# Patient Record
Sex: MALE | Race: WHITE | NOT HISPANIC OR LATINO | Employment: OTHER | URBAN - METROPOLITAN AREA
[De-identification: names, ages, dates, MRNs, and addresses within clinical notes are randomized per-mention and may not be internally consistent; named-entity substitution may affect disease eponyms.]

---

## 2017-02-06 ENCOUNTER — GENERIC CONVERSION - ENCOUNTER (OUTPATIENT)
Dept: OTHER | Facility: OTHER | Age: 72
End: 2017-02-06

## 2017-04-17 ENCOUNTER — GENERIC CONVERSION - ENCOUNTER (OUTPATIENT)
Dept: OTHER | Facility: OTHER | Age: 72
End: 2017-04-17

## 2017-06-23 ENCOUNTER — GENERIC CONVERSION - ENCOUNTER (OUTPATIENT)
Dept: OTHER | Facility: OTHER | Age: 72
End: 2017-06-23

## 2017-07-12 DIAGNOSIS — Z00.00 ENCOUNTER FOR GENERAL ADULT MEDICAL EXAMINATION WITHOUT ABNORMAL FINDINGS: ICD-10-CM

## 2017-07-18 ENCOUNTER — ALLSCRIPTS OFFICE VISIT (OUTPATIENT)
Dept: OTHER | Facility: OTHER | Age: 72
End: 2017-07-18

## 2018-01-13 NOTE — PROGRESS NOTES
Assessment    1  Encounter for preventive health examination (V70 0) (Z00 00)   2  Medicare annual wellness visit, subsequent (V70 0) (Z00 00)    Plan  Medicare annual wellness visit, subsequent    · Follow-up visit in 1 year Evaluation and Treatment  Follow-up  Status: Hold For -  Scheduling  Requested for: [id number]   · These are things you can do to prevent falls in and around the home ; Status:Complete;    Done: [id number] 08:54AM   · We encourage all of our patients to exercise regularly  30 minutes of exercise or physical  activity five or more days a week is recommended for children and adults ;  Status:Complete;   Done: [id number] 08:54AM    Chief Complaint  PT PRESENTS FOR AN AWV  PT REPORTS DOING WELL NO ISSUES  History of Present Illness  He is feeling well  He is very active  No complaints  The patient is being seen for the subsequent annual wellness visit  Medicare Screening and Risk Factors   Hospitalizations: no previous hospitalizations  Medicare Screening Tests Risk Questions   Drug and Alcohol Use: The patient has never smoked cigarettes  The patient reports occasional alcohol use and drinking 2 drinks per week  He has never used illicit drugs  Diet and Physical Activity: Current diet includes well balanced meals, 1 servings of fruit per day, 1 servings of vegetables per day, 1 servings of meat per day, 1 servings of whole grains per day, 1 cups of coffee per day and 2 cans of diet soda per day  He exercises 7 times per week  Exercise: walking, strength training 10 hours per week  Mood Disorder and Cognitive Impairment Screening: He denies feeling down, depressed, or hopeless over the past two weeks  He denies feeling little interest or pleasure in doing things over the past two weeks     Cognitive impairment screening: denies difficulty learning/retaining new information, denies difficulty handling complex tasks, denies difficulty with reasoning, denies difficulty with spatial ability and orientation, denies difficulty with language and denies difficulty with behavior  Functional Ability/Level of Safety: Hearing is normal bilaterally, normal in the right ear, normal in the left ear and a hearing aid is not used  He denies hearing difficulties  Activities of daily living details: does not need help using the phone, no transportation help needed, does not need help shopping, no meal preparation help needed, does not need help doing housework, does not need help doing laundry, does not need help managing medications and does not need help managing money  Home safety risk factors:  no unfamiliar surroundings, no loose rugs, no poor household lighting, no uneven floors, no household clutter, grab bars in the bathroom and handrails on the stairs  Advance Directives: Advance directives: living will, durable power of  for health care directives and advance directives  Co-Managers and Medical Equipment/Suppliers: See Patient Care Team   Falls Risk: The patient fell 0 times in the past 12 months  The patient is currently asymptomatic Symptoms Include:  Associated symptoms:  No associated symptoms are reported  The patient currently has no urinary incontinence symptoms  Date of last glaucoma screen was 2/2016      Review of Systems    Constitutional: negative  Head and Face: negative  Eyes: negative  ENT: negative  Cardiovascular: negative  Respiratory: negative  Gastrointestinal: negative  Genitourinary: negative  Musculoskeletal: negative  Integumentary and Breasts: negative  Neurological: negative  Psychiatric: negative  Endocrine: negative  Hematologic and Lymphatic: negative  Active Problems    1  Back pain (724 5) (M54 9)   2  Bursitis of right hip (726 5) (M70 71)   3  Bursitis of right shoulder (726 10) (M75 51)   4  Cervical radiculopathy (723 4) (M54 12)   5  Colon cancer screening (V76 51) (Z12 11)   6   Community acquired pneumonia (5) (J18 9)   7  Encounter for screening for cardiovascular disorders (V81 2) (Z13 6)   8  Need for prophylactic vaccination and inoculation against influenza (V04 81) (Z23)   9  Osteoarthritis of knee (715 36) (M17 9)   10  Preoperative clearance (V72 84) (Z01 818)   11  Subcutaneous nodule (782 2) (R22 9)   12  Viral gastroenteritis (008 8) (A08 4)    Past Medical History    1  History of Fracture Of The Ankle (824 8)   2  History of hemorrhoids (V13 89) (Z87 19)   3  Need for prophylactic vaccination and inoculation against influenza (V04 81) (Z23)    The active problems and past medical history were reviewed and updated today  Surgical History    1  History of Hemorrhoidectomy    Family History  Mother    1  Family history of Stroke Syndrome (V17 1)  Family History    2  Family history of Cancer   3  Family history of Diabetes Mellitus (V18 0)    The family history was reviewed and updated today  Social History    · Denied: History of Alcohol Use (History)   · Denied: History of Drug Use   · Never A Smoker  The social history was reviewed and updated today  Current Meds   1  Multiple Vitamins TABS; Therapy: (Recorded:14Nov2013) to Recorded    The medication list was reviewed and updated today  Allergies    1  No Known Drug Allergies    Immunizations  Hepatitis A --- [facility]: [id number]   Hepatitis B --- [facility]: [id number]; Peggy James: [id number]; Evelni Fontenot: [id number]   Influenza --- [facility]: [id number] [place]oon: 16-Oct-2013; [facility] 14-Oct[facility]; Series4:  23-Oct-2015; Catrachita Dec: [id number]; Series6: [id number]   PCV --- [facility]: 29-Jul-2015   PPSV --- [facility]: [id number]   Tdap --- Series1: [id number]   Zoster --- Series1: [id number]     Vitals  Signs   Recorded: [id number] 29:04QN   Systolic: 684, LUE, Sitting  Diastolic: 58, LUE, Sitting  Weight: 186 lb 4 oz  BMI Calculated: 29 61  BSA Calculated: 1 95    Health Management  Colon cancer screening   COLONOSCOPY; every 10 years;  Next Due: [id number]; Overdue    Signatures   Electronically signed by : Manuela Dominguez DO; Aug 31 2016  8:55AM EST                       (Author)

## 2018-01-14 VITALS
HEIGHT: 66 IN | DIASTOLIC BLOOD PRESSURE: 80 MMHG | BODY MASS INDEX: 30.7 KG/M2 | SYSTOLIC BLOOD PRESSURE: 122 MMHG | WEIGHT: 191 LBS | TEMPERATURE: 99.4 F

## 2018-03-30 ENCOUNTER — OFFICE VISIT (OUTPATIENT)
Dept: FAMILY MEDICINE CLINIC | Facility: CLINIC | Age: 73
End: 2018-03-30
Payer: COMMERCIAL

## 2018-03-30 VITALS — WEIGHT: 194.2 LBS | DIASTOLIC BLOOD PRESSURE: 70 MMHG | SYSTOLIC BLOOD PRESSURE: 132 MMHG | BODY MASS INDEX: 31.58 KG/M2

## 2018-03-30 DIAGNOSIS — T14.8XXA SKIN ABRASION: Primary | ICD-10-CM

## 2018-03-30 PROCEDURE — 99213 OFFICE O/P EST LOW 20 MIN: CPT | Performed by: FAMILY MEDICINE

## 2018-03-30 RX ORDER — CIPROFLOXACIN 500 MG/1
1 TABLET, FILM COATED ORAL 2 TIMES DAILY
COMMUNITY
Start: 2017-09-19 | End: 2018-03-30

## 2018-03-30 RX ORDER — MULTIVITAMIN WITH IRON
TABLET ORAL
COMMUNITY

## 2018-03-30 RX ORDER — CHLORAL HYDRATE 500 MG
CAPSULE ORAL
COMMUNITY

## 2018-03-30 NOTE — PROGRESS NOTES
Assessment/Plan:    22-year-old male with:  Skin abrasion on scrotum  Discussed local care  Discuss that this likely occurred due to inadvertent scratch well bathing  Discussed that if it is not healing or if it worsens he should call back  Discuss that if he notices blood coming from other sources such as his urine or his stool he must call immediately  No problem-specific Assessment & Plan notes found for this encounter  Diagnoses and all orders for this visit:    Skin abrasion    Other orders  -     aspirin 81 MG tablet; Take by mouth  -     Calcium Carb-Cholecalciferol 1000-800 MG-UNIT TABS; Take by mouth  -     Discontinue: ciprofloxacin (CIPRO) 500 mg tablet; Take 1 tablet by mouth 2 (two) times a day  -     Omega-3 Fatty Acids (FISH OIL) 1,000 mg; Take by mouth  -     Multiple Vitamins tablet; Take by mouth          Subjective:   Chief Complaint   Patient presents with    Bleeding/Bruising          Patient ID: Singh Potts is a 74 y o  male  Patient is a 22-year-old male who presents complaining of some bleeding that was observed today in the shower  Patient went to the gym and after his workout was in the shower when he noticed that there was blood on the floor  Patient looked and did not see any obvious signs of blood in his urine or his stool although he did notice some blood in his underwear  No fevers chills nausea vomiting  No dysuria  No constipation or diarrhea  no perceived trauma that he is aware of  The following portions of the patient's history were reviewed and updated as appropriate: allergies, current medications, past family history, past medical history, past social history, past surgical history and problem list     Review of Systems   Constitutional: Negative  HENT: Negative  Eyes: Negative  Respiratory: Negative  Cardiovascular: Negative  Gastrointestinal: Negative  Endocrine: Negative  Genitourinary: Negative      Musculoskeletal: Negative  Allergic/Immunologic: Negative  Neurological: Negative  Hematological: Negative  Psychiatric/Behavioral: Negative  All other systems reviewed and are negative  Objective:      /70 (BP Location: Right arm, Patient Position: Sitting, Cuff Size: Standard)   Wt 88 1 kg (194 lb 3 2 oz)   BMI 31 58 kg/m²          Physical Exam   Constitutional: He is oriented to person, place, and time  He appears well-developed and well-nourished  HENT:   Head: Atraumatic  Right Ear: External ear normal    Left Ear: External ear normal    Eyes: Conjunctivae and EOM are normal  Pupils are equal, round, and reactive to light  Neck: Normal range of motion  Pulmonary/Chest: Effort normal  No respiratory distress  Genitourinary:   Genitourinary Comments: Small abrasion with dried blood on his scrotum  Nontender  No erythema  Musculoskeletal: Normal range of motion  Neurological: He is alert and oriented to person, place, and time  No cranial nerve deficit  Skin: Skin is warm and dry  Psychiatric: He has a normal mood and affect   His behavior is normal  Judgment and thought content normal

## 2018-07-02 DIAGNOSIS — Z00.00 ADULT GENERAL MEDICAL EXAMINATION: Primary | ICD-10-CM

## 2018-07-12 ENCOUNTER — OFFICE VISIT (OUTPATIENT)
Dept: FAMILY MEDICINE CLINIC | Facility: CLINIC | Age: 73
End: 2018-07-12
Payer: COMMERCIAL

## 2018-07-12 VITALS
SYSTOLIC BLOOD PRESSURE: 110 MMHG | HEIGHT: 66 IN | WEIGHT: 185.6 LBS | BODY MASS INDEX: 29.83 KG/M2 | DIASTOLIC BLOOD PRESSURE: 60 MMHG

## 2018-07-12 DIAGNOSIS — Z12.11 SCREENING FOR COLON CANCER: ICD-10-CM

## 2018-07-12 DIAGNOSIS — R13.19 ESOPHAGEAL DYSPHAGIA: ICD-10-CM

## 2018-07-12 DIAGNOSIS — Z00.00 WELL ADULT EXAM: Primary | ICD-10-CM

## 2018-07-12 PROBLEM — T14.8XXA SKIN ABRASION: Status: RESOLVED | Noted: 2018-03-30 | Resolved: 2018-07-12

## 2018-07-12 PROCEDURE — 1160F RVW MEDS BY RX/DR IN RCRD: CPT | Performed by: FAMILY MEDICINE

## 2018-07-12 PROCEDURE — 99397 PER PM REEVAL EST PAT 65+ YR: CPT | Performed by: FAMILY MEDICINE

## 2018-07-12 RX ORDER — BIOTIN 10000 MCG
CAPSULE ORAL
COMMUNITY

## 2018-07-12 RX ORDER — MAGNESIUM 30 MG
30 TABLET ORAL 2 TIMES DAILY
COMMUNITY

## 2018-07-12 RX ORDER — PROPRANOLOL/HYDROCHLOROTHIAZID 40 MG-25MG
TABLET ORAL
COMMUNITY

## 2018-07-12 NOTE — PROGRESS NOTES
Assessment/Plan: We reviewed his recent lab work today  The numbers are all good  Continue diet exercise  Follow-up in 6 months sooner if needed  Diagnoses and all orders for this visit:    Well adult exam    Screening for colon cancer    Esophageal dysphagia  -     Ambulatory Referral to Otolaryngology; Future    Other orders  -     Cancel: Ambulatory referral to Gastroenterology; Future  -     Cancel: Occult Bloood,Fecal Immunochemical; Future  -     magnesium 30 MG tablet; Take 30 mg by mouth 2 (two) times a day  -     Turmeric 500 MG CAPS; Take by mouth  -     Biotin 10 MG CAPS; Take by mouth  -     Cod Liver Oil 1000 MG CAPS; Take by mouth          Subjective:      Patient ID: Malissa Ellis is a 74 y o  male  He is feeling well, no complaints  The following portions of the patient's history were reviewed and updated as appropriate: allergies, current medications, past family history, past medical history, past social history, past surgical history and problem list     Review of Systems   Constitutional: Negative  HENT: Negative  Eyes: Negative  Respiratory: Negative  Cardiovascular: Negative  Gastrointestinal: Negative  Endocrine: Negative  Genitourinary: Negative  Musculoskeletal: Negative  Skin: Negative  Allergic/Immunologic: Negative  Neurological: Negative  Hematological: Negative  Psychiatric/Behavioral: Negative  All other systems reviewed and are negative  Objective:      /60 (BP Location: Left arm, Patient Position: Sitting, Cuff Size: Standard)   Ht 5' 5 75" (1 67 m)   Wt 84 2 kg (185 lb 9 6 oz)   BMI 30 18 kg/m²          Physical Exam   Constitutional: He is oriented to person, place, and time  He appears well-developed and well-nourished  HENT:   Head: Normocephalic and atraumatic     Right Ear: External ear normal    Left Ear: External ear normal    Nose: Nose normal    Mouth/Throat: Oropharynx is clear and moist  Eyes: Conjunctivae and EOM are normal  Pupils are equal, round, and reactive to light  Neck: Normal range of motion  Neck supple  Cardiovascular: Normal rate, regular rhythm and normal heart sounds  Pulmonary/Chest: Effort normal and breath sounds normal    Abdominal: Soft  Bowel sounds are normal    Musculoskeletal: Normal range of motion  Neurological: He is alert and oriented to person, place, and time  He has normal reflexes  Skin: Skin is warm and dry  Psychiatric: He has a normal mood and affect  His behavior is normal    Nursing note and vitals reviewed

## 2018-09-25 ENCOUNTER — HOSPITAL ENCOUNTER (EMERGENCY)
Facility: HOSPITAL | Age: 73
Discharge: HOME | End: 2018-09-25
Attending: EMERGENCY MEDICINE
Payer: COMMERCIAL

## 2018-09-25 VITALS
OXYGEN SATURATION: 100 % | RESPIRATION RATE: 20 BRPM | DIASTOLIC BLOOD PRESSURE: 64 MMHG | SYSTOLIC BLOOD PRESSURE: 132 MMHG | TEMPERATURE: 97.6 F | HEART RATE: 60 BPM

## 2018-09-25 DIAGNOSIS — H11.32 SUBCONJUNCTIVAL HEMORRHAGE, NON-TRAUMATIC, LEFT: ICD-10-CM

## 2018-09-25 DIAGNOSIS — H11.422 CHEMOSIS OF CONJUNCTIVA SUBCONJUNCTIVAL EDEMA, LEFT: Primary | ICD-10-CM

## 2018-09-25 PROCEDURE — 99283 EMERGENCY DEPT VISIT LOW MDM: CPT

## 2018-09-25 RX ORDER — ERYTHROMYCIN 5 MG/G
OINTMENT OPHTHALMIC
Qty: 1 G | Refills: 0 | Status: SHIPPED | OUTPATIENT
Start: 2018-09-25 | End: 2019-06-04

## 2018-09-25 RX ORDER — BUPIVACAINE HYDROCHLORIDE 5 MG/ML
5 INJECTION, SOLUTION EPIDURAL; INTRACAUDAL ONCE
Status: DISCONTINUED | OUTPATIENT
Start: 2018-09-25 | End: 2018-09-25

## 2018-09-25 RX ADMIN — FLUORESCEIN SODIUM AND PROPARACAINE HYDROCHLORIDE 1 DROP: 2.5; 5 SOLUTION/ DROPS OPHTHALMIC at 22:10

## 2018-09-26 NOTE — DISCHARGE INSTRUCTIONS
Subconjunctival Hemorrhage   WHAT YOU NEED TO KNOW:   What is a subconjunctival hemorrhage? A subconjunctival hemorrhage is when blood collects under the conjunctiva in your eye  The conjunctiva is the clear lining that covers the white part of your eye  The blood comes from broken blood vessels under the conjunctiva  What causes a subconjunctival hemorrhage? The exact cause of your subconjunctival hemorrhage may not be known  The following are common causes:  · An accident or injury to the eye    · Hard coughs or sneezes    · Medical conditions, such as high blood pressure, diabetes, or a bleeding disorder    · Strain, such as when you lift a heavy object or have a bowel movement    · Blood thinning medicines    · Vomiting  What are the signs and symptoms of a subconjunctival hemorrhage? The most common sign is a red patch on the white part of your eye  The redness may be present for 2 to 3 weeks  You may have mild pain when you move your eye  How is a subconjunctival hemorrhage diagnosed? Your healthcare provider will examine your eye and check your vision  You may need tests to check for an underlying medical condition  How is a subconjunctival hemorrhage treated? A subconjunctival hemorrhage usually goes away on its own  You may need any of the following to help manage your symptoms:  · Cold or warm compress:  Use a cold pack during the first 24 hours  Ask how often to apply it and for how long each time  After the first 24 hours, apply a warm pack on your eye  Do this 3 times each day for about 10 to 15 minutes each time  · Eyedrops: You may need artificial tears to keep your eye moist  Use the drops as directed  When should I contact my healthcare provider? · The redness in your eye has not gone away after 3 weeks  · You have another subconjunctival hemorrhage  · You have subconjunctival hemorrhages in both eyes      · You have questions or concerns about your condition or care   When should I seek immediate care? · You have eye pain or sensitivity to light  · Your vision changes  · You have white or yellow discharge from your eye  CARE AGREEMENT:   You have the right to help plan your care  Learn about your health condition and how it may be treated  Discuss treatment options with your caregivers to decide what care you want to receive  You always have the right to refuse treatment  The above information is an  only  It is not intended as medical advice for individual conditions or treatments  Talk to your doctor, nurse or pharmacist before following any medical regimen to see if it is safe and effective for you  © 2017 [employer]  Information is for End User's use only and may not be sold, redistributed or otherwise used for commercial purposes  All illustrations and images included in CareNotes® are the copyrighted property of A D [employer] M [employer] Inc  or Candel[employer]

## 2018-09-26 NOTE — ED ATTENDING ATTESTATION
Rod Botello DO, saw and evaluated the patient  I have discussed the patient with the resident/non-physician practitioner and agree with the resident's/non-physician practitioner's findings, Plan of Care, and MDM as documented in the resident's/non-physician practitioner's note, except where noted  All available labs and Radiology studies were reviewed  At this point I agree with the current assessment done in the Emergency Department  I have conducted an independent evaluation of this patient a history and physical is as follows:    Pt seen and examined  Takes baby ASA daily, began around 1830 with spontaneous eye irritation - looked in mirror and saw bloody chemosis/ANGEL  No trauma, no valsalva that he remembers  Visual acuity nml, PERRL, no hyphema  Pressure with sandhya pen 21, no uptake with fluorocein stain and pt to f/u with optho       Final diagnoses:   Chemosis of conjunctiva subconjunctival edema, left   Subconjunctival hemorrhage, non-traumatic, left     Critical Care Time  CritCare Time    Procedures

## 2018-09-26 NOTE — ED PROVIDER NOTES
History  Chief Complaint   Patient presents with    Eye Problem     Pt complains of blood shot left eye while eating supper tonight  Reports pain behind left eye  Denies injuries  Denies vision changes  This is a 20-year-old male with no past medical history presents to the emergency department this evening with left eye irritation that started around 6:00 p m  Prabhu Mota Patient states that he was eating dinner and without any inciting event his left eye became irritated with no pain  Patient states he finished eating his dinner and afterwards went to look in appearance all the is left eye had become blood shot  Patient states the initially it was not that bad any is had that happen before so he did not think much of it  However, the injection became worse in started to have pain behind the eye so he wanted to come in and get checked out to make sure that it was not a warning sign for something more serious    Patient states that the pain behind his eyes currently a 1/10 and is more annoying than anything else  He denies any vision changes and he does not have any pain with looking into bright light or with any eye movement  Patient states he takes a baby aspirin a day along with visual but denies any other blood thinners  He does not wear contacts  Prior to Admission Medications   Prescriptions Last Dose Informant Patient Reported? Taking?    Biotin 10 MG CAPS   Yes No   Sig: Take by mouth   Calcium Carb-Cholecalciferol 1000-800 MG-UNIT TABS   Yes No   Sig: Take by mouth   Cod Liver Oil 1000 MG CAPS   Yes No   Sig: Take by mouth   Multiple Vitamins tablet   Yes No   Sig: Take by mouth   Omega-3 Fatty Acids (FISH OIL) 1,000 mg   Yes No   Sig: Take by mouth   Turmeric 500 MG CAPS   Yes No   Sig: Take by mouth   aspirin 81 MG tablet   Yes No   Sig: Take by mouth   magnesium 30 MG tablet   Yes No   Sig: Take 30 mg by mouth 2 (two) times a day      Facility-Administered Medications: None       Past Medical History:   Diagnosis Date    Fracture of ankle     Hemorrhoids        Past Surgical History:   Procedure Laterality Date    HEMORRHOID SURGERY         Family History   Problem Relation Age of Onset    Stroke Mother     Cancer Family     Diabetes Family      I have reviewed and agree with the history as documented  Social History   Substance Use Topics    Smoking status: Never Smoker    Smokeless tobacco: Never Used    Alcohol use Yes      Comment: occasional wine        Review of Systems   Constitutional: Negative for chills, diaphoresis and fever  HENT: Negative for congestion, rhinorrhea, sinus pressure and sore throat  Eyes: Positive for pain and redness  Negative for photophobia, discharge and visual disturbance  Respiratory: Negative for cough, chest tightness and shortness of breath  Cardiovascular: Negative for chest pain  Gastrointestinal: Negative for abdominal pain, constipation, diarrhea, nausea and vomiting  Genitourinary: Negative for dysuria, frequency, hematuria and urgency  Musculoskeletal: Negative for arthralgias and myalgias  Skin: Negative for color change and rash  Neurological: Negative for dizziness, numbness and headaches  Physical Exam  ED Triage Vitals [09/25/18 2102]   Temperature Pulse Respirations Blood Pressure SpO2   97 6 °F (36 4 °C) 60 20 132/64 100 %      Temp Source Heart Rate Source Patient Position - Orthostatic VS BP Location FiO2 (%)   Temporal Monitor -- Right arm --      Pain Score       --           Orthostatic Vital Signs  Vitals:    09/25/18 2102   BP: 132/64   Pulse: 60       Physical Exam   Constitutional: He is oriented to person, place, and time  He appears well-developed and well-nourished  No distress  HENT:   Head: Normocephalic and atraumatic  Eyes: EOM are normal  Pupils are equal, round, and reactive to light  Right eye exhibits no discharge  Left eye exhibits no discharge  No scleral icterus     Patient's left eye with extensive subconjunctival hemorrhage with chemosis  Neck: Normal range of motion  Neck supple  No JVD present  Cardiovascular: Normal rate, regular rhythm and normal heart sounds  Exam reveals no gallop and no friction rub  No murmur heard  Pulmonary/Chest: Effort normal and breath sounds normal  No stridor  No respiratory distress  He has no wheezes  He has no rales  Abdominal: Soft  Bowel sounds are normal  He exhibits no distension  There is no tenderness  There is no guarding  Musculoskeletal: Normal range of motion  He exhibits no edema, tenderness or deformity  Neurological: He is alert and oriented to person, place, and time  No cranial nerve deficit or sensory deficit  He exhibits normal muscle tone  Skin: Skin is warm and dry  No rash noted  He is not diaphoretic  No erythema  No pallor  Psychiatric: He has a normal mood and affect  His behavior is normal    Nursing note and vitals reviewed  ED Medications  Medications   Fluorescein-Proparacaine (FLUCAINE) 0 25-0 5 % ophthalmic solution 1 drop (1 drop Left Eye Given 9/25/18 2210)       Diagnostic Studies  Results Reviewed     None                 No orders to display         Procedures  Procedures      Phone Consults  ED Phone Contact    ED Course                               MDM  Number of Diagnoses or Management Options  Chemosis of conjunctiva subconjunctival edema, left:   Subconjunctival hemorrhage, non-traumatic, left:   Diagnosis management comments: Patient intra-ocular pressure was 21 in the left eye, his visual acuity was unchanged and equal to his right eye, there was no fluorescein uptake when examined with a Wood's lamp, and bedside ultrasound did not reveal any signs of retinal detachment or hemorrhage  Patient was discharged home with erythromycin ointment and an eye patch and given the contact information for Ophthalmology and told to follow up with them as soon as possible    Patient instructed to return to the emergency department should he develop any new or concerning symptoms  CritCare Time    Disposition  Final diagnoses:   Chemosis of conjunctiva subconjunctival edema, left   Subconjunctival hemorrhage, non-traumatic, left     Time reflects when diagnosis was documented in both MDM as applicable and the Disposition within this note     Time User Action Codes Description Comment    9/25/2018  9:43 PM Augustine Bolton Add [R10 9] Abdominal pain     9/25/2018 10:18 PM Earna Lev Remove [R10 9] Abdominal pain     9/25/2018 10:19 PM Wilson Jose Roberto M Add [H94 207] Chemosis of conjunctiva subconjunctival edema, left     9/25/2018 10:19 PM Wilson Jose Roberto M Add [H11 32] Subconjunctival hemorrhage, non-traumatic, left       ED Disposition     ED Disposition Condition Comment    Discharge  Towana Pod discharge to home/self care      Condition at discharge: Good        Follow-up Information     Follow up With Specialties Details Why Contact Info Additional Information    Monika Lerma MD Ophthalmology Schedule an appointment as soon as possible for a visit  [address]  [address] [address] Emergency Department Emergency Medicine  If symptoms worsen [address] [facility]  [phone] [place] [facility], [address], [place], [address]          Discharge Medication List as of 9/25/2018 10:24 PM      START taking these medications    Details   erythromycin (ILOTYCIN) ophthalmic ointment Place a 1/2 inch ribbon of ointment into the lower eyelid every 4 hours while awake, Print         CONTINUE these medications which have NOT CHANGED    Details   aspirin 81 MG tablet Take by mouth, Historical Med      Biotin 10 MG CAPS Take by mouth, Historical Med      Calcium Carb-Cholecalciferol 1000-800 MG-UNIT TABS Take by mouth, Historical Med      Cod Liver Oil 1000 MG CAPS Take by mouth, Historical Med      magnesium 30 MG tablet Take 30 mg by mouth 2 (two) times a day, Historical Med      Multiple Vitamins tablet Take by mouth, Historical Med      Omega-3 Fatty Acids (FISH OIL) 1,000 mg Take by mouth, Historical Med      Turmeric 500 MG CAPS Take by mouth, Historical Med           No discharge procedures on file  ED Provider  Attending physically available and evaluated Alan Brown  VERNON managed the patient along with the ED Attending      Electronically Signed by         Sandra Ennis MD  09/26/18 0000

## 2018-10-29 ENCOUNTER — IMMUNIZATION (OUTPATIENT)
Dept: FAMILY MEDICINE CLINIC | Facility: CLINIC | Age: 73
End: 2018-10-29
Payer: COMMERCIAL

## 2018-10-29 DIAGNOSIS — Z23 NEED FOR INFLUENZA VACCINATION: Primary | ICD-10-CM

## 2018-10-29 PROCEDURE — 4040F PNEUMOC VAC/ADMIN/RCVD: CPT

## 2018-10-29 PROCEDURE — G0008 ADMIN INFLUENZA VIRUS VAC: HCPCS

## 2018-10-29 PROCEDURE — 90662 IIV NO PRSV INCREASED AG IM: CPT

## 2019-01-31 ENCOUNTER — TELEPHONE (OUTPATIENT)
Dept: FAMILY MEDICINE CLINIC | Facility: CLINIC | Age: 74
End: 2019-01-31

## 2019-01-31 NOTE — TELEPHONE ENCOUNTER
I am not sure which gastro a issues he is referring to  He is overdue for an appointment and it would be better for him to come to the office and see me we can discuss it face to face during his appointment  If he refuses he should at least let me know what gastro is use he would like something for  Please call him and see if he will make an appointment to see me

## 2019-02-01 DIAGNOSIS — Z71.84 TRAVEL ADVICE ENCOUNTER: Primary | ICD-10-CM

## 2019-02-01 DIAGNOSIS — Z71.84 TRAVEL ADVICE ENCOUNTER: ICD-10-CM

## 2019-02-01 RX ORDER — CIPROFLOXACIN 500 MG/1
500 TABLET, FILM COATED ORAL EVERY 12 HOURS SCHEDULED
Qty: 14 TABLET | Refills: 0 | Status: SHIPPED | OUTPATIENT
Start: 2019-02-01 | End: 2019-02-01 | Stop reason: SDUPTHER

## 2019-02-01 RX ORDER — CIPROFLOXACIN 500 MG/1
500 TABLET, FILM COATED ORAL EVERY 12 HOURS SCHEDULED
Qty: 14 TABLET | Refills: 0 | Status: SHIPPED | OUTPATIENT
Start: 2019-02-01 | End: 2019-02-08

## 2019-02-01 NOTE — TELEPHONE ENCOUNTER
Patient tour group and [facility] suggested that tourist have something to take in case of possible gastro intestinal problem (s)

## 2019-03-18 ENCOUNTER — OFFICE VISIT (OUTPATIENT)
Dept: FAMILY MEDICINE CLINIC | Facility: CLINIC | Age: 74
End: 2019-03-18
Payer: COMMERCIAL

## 2019-03-18 VITALS
SYSTOLIC BLOOD PRESSURE: 118 MMHG | DIASTOLIC BLOOD PRESSURE: 70 MMHG | HEIGHT: 66 IN | TEMPERATURE: 99.2 F | WEIGHT: 184 LBS | BODY MASS INDEX: 29.57 KG/M2

## 2019-03-18 DIAGNOSIS — A09 DIARRHEA OF INFECTIOUS ORIGIN: Primary | ICD-10-CM

## 2019-03-18 PROCEDURE — 99213 OFFICE O/P EST LOW 20 MIN: CPT | Performed by: FAMILY MEDICINE

## 2019-03-18 RX ORDER — DIPHENOXYLATE HYDROCHLORIDE AND ATROPINE SULFATE 2.5; .025 MG/1; MG/1
1 TABLET ORAL 4 TIMES DAILY PRN
Qty: 30 TABLET | Refills: 0 | Status: SHIPPED | OUTPATIENT
Start: 2019-03-18 | End: 2019-06-04

## 2019-03-18 NOTE — PROGRESS NOTES
Assessment/Plan:    I recommend supportive care, fluids, probiotics, a yogurt a day  Follow-up if not a lot better in 3 days  Diagnoses and all orders for this visit:    Diarrhea of infectious origin  -     diphenoxylate-atropine (LOMOTIL) 2 5-0 025 mg per tablet; Take 1 tablet by mouth 4 (four) times a day as needed for diarrhea          Subjective:      Patient ID: Willy Ravi is a 74 y o  male  [place] 3 [place] ago  He developed diarrhea  He did have some Cipro which she took which seemed to resolve the symptoms but his symptoms are returning since he finished it  The following portions of the patient's history were reviewed and updated as appropriate: allergies, current medications, past family history, past medical history, past social history, past surgical history and problem list     Review of Systems   Constitutional: Negative  HENT: Negative  Eyes: Negative  Respiratory: Negative  Cardiovascular: Negative  Gastrointestinal: Positive for abdominal pain and diarrhea  Negative for abdominal distention, nausea, rectal pain and vomiting  Endocrine: Negative  Genitourinary: Negative  Musculoskeletal: Negative  Skin: Negative  Allergic/Immunologic: Negative  Neurological: Negative  Hematological: Negative  Psychiatric/Behavioral: Negative  All other systems reviewed and are negative  Objective:      /70 (BP Location: Left arm)   Temp 99 2 °F (37 3 °C)   Ht 5' 5 75" (1 67 m)   Wt 83 5 kg (184 lb)   BMI 29 92 kg/m²          Physical Exam   Constitutional: He is oriented to person, place, and time  He appears well-developed and well-nourished  HENT:   Head: Normocephalic and atraumatic  Right Ear: External ear normal    Left Ear: External ear normal    Nose: Nose normal    Mouth/Throat: Oropharynx is clear and moist    Eyes: Pupils are equal, round, and reactive to light   Conjunctivae and EOM are normal    Neck: Normal range of motion  Neck supple  Cardiovascular: Normal rate, regular rhythm and normal heart sounds  Pulmonary/Chest: Effort normal and breath sounds normal    Abdominal: Soft  Bowel sounds are normal    Musculoskeletal: Normal range of motion  Neurological: He is alert and oriented to person, place, and time  He has normal reflexes  Skin: Skin is warm and dry  Psychiatric: He has a normal mood and affect  His behavior is normal    Nursing note and vitals reviewed

## 2019-03-21 ENCOUNTER — OFFICE VISIT (OUTPATIENT)
Dept: FAMILY MEDICINE CLINIC | Facility: CLINIC | Age: 74
End: 2019-03-21
Payer: COMMERCIAL

## 2019-03-21 VITALS
BODY MASS INDEX: 29.99 KG/M2 | WEIGHT: 180 LBS | TEMPERATURE: 98.5 F | HEIGHT: 65 IN | DIASTOLIC BLOOD PRESSURE: 76 MMHG | SYSTOLIC BLOOD PRESSURE: 112 MMHG

## 2019-03-21 DIAGNOSIS — A09 DIARRHEA OF INFECTIOUS ORIGIN: Primary | ICD-10-CM

## 2019-03-21 PROCEDURE — 1036F TOBACCO NON-USER: CPT | Performed by: FAMILY MEDICINE

## 2019-03-21 PROCEDURE — 1160F RVW MEDS BY RX/DR IN RCRD: CPT | Performed by: FAMILY MEDICINE

## 2019-03-21 PROCEDURE — 99213 OFFICE O/P EST LOW 20 MIN: CPT | Performed by: FAMILY MEDICINE

## 2019-03-21 RX ORDER — CIPROFLOXACIN 500 MG/1
500 TABLET, FILM COATED ORAL EVERY 12 HOURS SCHEDULED
Qty: 20 TABLET | Refills: 0 | Status: SHIPPED | OUTPATIENT
Start: 2019-03-21 | End: 2019-03-31

## 2019-03-21 NOTE — PROGRESS NOTES
Assessment/Plan:    Will check some stool cultures for Giardia C diff Cryptosporidium  Start Cipro  Follow-up in 3-5 days if not a lot better  Diagnoses and all orders for this visit:    Diarrhea of infectious origin  -     Giardia antigen; Future  -     Stool Enteric Bacterial Panel by PCR; Future  -     Clostridium difficile toxin by PCR; Future  -     ciprofloxacin (CIPRO) 500 mg tablet; Take 1 tablet (500 mg total) by mouth every 12 (twelve) hours for 10 days          Subjective:      Patient ID: Mariano Cuellar is a 74 y o  male  He still suffering from persistent diarrhea and gas pains  When he wakes up in the morning generally he is good but the diarrhea comes on in the afternoon  The Lomotil seems to help the symptoms but they persist   When he was in [facility] he took some Cipro with seem to help but the symptoms did return  The following portions of the patient's history were reviewed and updated as appropriate: allergies, current medications, past family history, past medical history, past social history, past surgical history and problem list     Review of Systems   Constitutional: Negative  HENT: Negative  Respiratory: Negative  Gastrointestinal: Positive for abdominal distention, abdominal pain and diarrhea  Negative for blood in stool and constipation  Genitourinary: Negative  Objective:      /76 (BP Location: Right arm, Patient Position: Sitting, Cuff Size: Standard)   Temp 98 5 °F (36 9 °C) (Tympanic)   Ht 5' 5" (1 651 m)   Wt 81 6 kg (180 lb)   BMI 29 95 kg/m²          Physical Exam   Constitutional: He is oriented to person, place, and time  He appears well-developed and well-nourished  HENT:   Head: Normocephalic and atraumatic  Right Ear: External ear normal    Left Ear: External ear normal    Nose: Nose normal    Mouth/Throat: Oropharynx is clear and moist    Eyes: Pupils are equal, round, and reactive to light   Conjunctivae and EOM are normal    Neck: Normal range of motion  Neck supple  Cardiovascular: Normal rate, regular rhythm and normal heart sounds  Pulmonary/Chest: Effort normal and breath sounds normal    Abdominal: Soft  He exhibits distension  He exhibits no mass  There is no tenderness  There is no guarding  Musculoskeletal: Normal range of motion  Neurological: He is alert and oriented to person, place, and time  He has normal reflexes  Skin: Skin is warm and dry  Psychiatric: He has a normal mood and affect  His behavior is normal    Nursing note and vitals reviewed

## 2019-03-26 ENCOUNTER — TELEPHONE (OUTPATIENT)
Dept: FAMILY MEDICINE CLINIC | Facility: CLINIC | Age: 74
End: 2019-03-26

## 2019-03-27 NOTE — TELEPHONE ENCOUNTER
I spoke with the pt  I explained the detail below to him  He is still currently finishing the Cipro he was prescribed so he cannot make a good judgement as to how his symptoms are  He is wondering a) what are potential causes for this and b) if he is contagious?? **I have not called Constance Mohan back from [facility] because it is after 6 pm and she is only in the office until 430  **

## 2019-03-27 NOTE — TELEPHONE ENCOUNTER
It is often times not clear where he got the bacterium from  Sometimes it can be from taking did food or from someone in his family or friends that have the infection or sometimes from something like a pet such as a reptile    Patient should finish the antibiotics and also take probiotics and if his symptoms are not improving or if they are worsening we can have him see GI

## 2019-03-27 NOTE — TELEPHONE ENCOUNTER
Please call patient  Perhaps the call was before the Campylobacter was back  Please inform patient that, yes, the stool test for Campylobacter is positive  If patient is still symptomatic we can treat him if he would like

## 2019-03-27 NOTE — TELEPHONE ENCOUNTER
Raegan Tipton from the department of health called questioning why the patients labs detected campylobacter spp but he was told its normal   Raegan Tipton and patient would like an explanation  Call back number for Raegan Tipton is ([phone])  She is only in the office until 4:30 today she will be back tomorrow

## 2019-03-28 NOTE — TELEPHONE ENCOUNTER
I spoke to Dr Mario Alberto [place] he states this is taken care of (he spoke to [place]) and is going to try to contact Dr Stephania Bernard today to discuss this with him

## 2019-06-04 ENCOUNTER — OFFICE VISIT (OUTPATIENT)
Dept: FAMILY MEDICINE CLINIC | Facility: CLINIC | Age: 74
End: 2019-06-04
Payer: COMMERCIAL

## 2019-06-04 VITALS
TEMPERATURE: 97.9 F | DIASTOLIC BLOOD PRESSURE: 68 MMHG | BODY MASS INDEX: 30.99 KG/M2 | WEIGHT: 186 LBS | SYSTOLIC BLOOD PRESSURE: 112 MMHG | HEIGHT: 65 IN

## 2019-06-04 DIAGNOSIS — J30.1 SEASONAL ALLERGIC RHINITIS DUE TO POLLEN: Primary | ICD-10-CM

## 2019-06-04 PROCEDURE — 1036F TOBACCO NON-USER: CPT | Performed by: FAMILY MEDICINE

## 2019-06-04 PROCEDURE — 1160F RVW MEDS BY RX/DR IN RCRD: CPT | Performed by: FAMILY MEDICINE

## 2019-06-04 PROCEDURE — 99213 OFFICE O/P EST LOW 20 MIN: CPT | Performed by: FAMILY MEDICINE

## 2019-06-04 RX ORDER — DESLORATADINE 5 MG/1
5 TABLET ORAL DAILY
Qty: 30 TABLET | Refills: 2 | Status: SHIPPED | OUTPATIENT
Start: 2019-06-04 | End: 2019-09-02

## 2019-06-04 RX ORDER — MONTELUKAST SODIUM 10 MG/1
10 TABLET ORAL
Qty: 30 TABLET | Refills: 5 | Status: SHIPPED | OUTPATIENT
Start: 2019-06-04

## 2019-07-30 ENCOUNTER — TELEPHONE (OUTPATIENT)
Dept: FAMILY MEDICINE CLINIC | Facility: CLINIC | Age: 74
End: 2019-07-30

## 2019-07-30 DIAGNOSIS — Z00.00 WELL ADULT EXAM: Primary | ICD-10-CM

## 2019-07-30 DIAGNOSIS — Z12.5 PROSTATE CANCER SCREENING: ICD-10-CM

## 2019-07-30 NOTE — TELEPHONE ENCOUNTER
PT IS COMING IN FOR A PHYSICAL & WANTS LABS DONE BEFORE HIS APPT ON 08/08/19 - WHAT LABS WOULD YOU LIKED ORDERED? PT HAS TO COME IN TODAY TO GET THE PAPERWORK

## 2019-08-08 ENCOUNTER — OFFICE VISIT (OUTPATIENT)
Dept: FAMILY MEDICINE CLINIC | Facility: CLINIC | Age: 74
End: 2019-08-08
Payer: COMMERCIAL

## 2019-08-08 VITALS
SYSTOLIC BLOOD PRESSURE: 118 MMHG | BODY MASS INDEX: 30.39 KG/M2 | DIASTOLIC BLOOD PRESSURE: 62 MMHG | OXYGEN SATURATION: 98 % | HEART RATE: 72 BPM | WEIGHT: 182.4 LBS | HEIGHT: 65 IN

## 2019-08-08 DIAGNOSIS — Z00.00 MEDICARE ANNUAL WELLNESS VISIT, SUBSEQUENT: Primary | ICD-10-CM

## 2019-08-08 DIAGNOSIS — N18.31 CHRONIC KIDNEY DISEASE (CKD) STAGE G3A/A1, MODERATELY DECREASED GLOMERULAR FILTRATION RATE (GFR) BETWEEN 45-59 ML/MIN/1.73 SQUARE METER AND ALBUMINURIA CREATININE RATIO LESS THAN 30 MG/G (HCC): ICD-10-CM

## 2019-08-08 PROCEDURE — 4040F PNEUMOC VAC/ADMIN/RCVD: CPT | Performed by: FAMILY MEDICINE

## 2019-08-08 PROCEDURE — 3725F SCREEN DEPRESSION PERFORMED: CPT | Performed by: FAMILY MEDICINE

## 2019-08-08 PROCEDURE — 1036F TOBACCO NON-USER: CPT | Performed by: FAMILY MEDICINE

## 2019-08-08 PROCEDURE — 1160F RVW MEDS BY RX/DR IN RCRD: CPT | Performed by: FAMILY MEDICINE

## 2019-08-08 PROCEDURE — 1101F PT FALLS ASSESS-DOCD LE1/YR: CPT | Performed by: FAMILY MEDICINE

## 2019-08-08 PROCEDURE — 1125F AMNT PAIN NOTED PAIN PRSNT: CPT | Performed by: FAMILY MEDICINE

## 2019-08-08 PROCEDURE — 1170F FXNL STATUS ASSESSED: CPT | Performed by: FAMILY MEDICINE

## 2019-08-08 PROCEDURE — 90471 IMMUNIZATION ADMIN: CPT | Performed by: FAMILY MEDICINE

## 2019-08-08 PROCEDURE — G0439 PPPS, SUBSEQ VISIT: HCPCS | Performed by: FAMILY MEDICINE

## 2019-08-08 PROCEDURE — 90714 TD VACC NO PRESV 7 YRS+ IM: CPT | Performed by: FAMILY MEDICINE

## 2019-08-08 PROCEDURE — 90732 PPSV23 VACC 2 YRS+ SUBQ/IM: CPT | Performed by: FAMILY MEDICINE

## 2019-08-08 PROCEDURE — G0009 ADMIN PNEUMOCOCCAL VACCINE: HCPCS | Performed by: FAMILY MEDICINE

## 2019-08-08 RX ORDER — LANOLIN ALCOHOL/MO/W.PET/CERES
1 CREAM (GRAM) TOPICAL 3 TIMES DAILY
COMMUNITY

## 2019-08-08 NOTE — PROGRESS NOTES
Assessment and Plan:     Problem List Items Addressed This Visit        Genitourinary    Chronic kidney disease (CKD) stage G3a/A1, moderately decreased glomerular filtration rate (GFR) between 45-59 mL/min/1 73 square meter and albuminuria creatinine ratio less than 30 mg/g ([facility])    Relevant Orders    Renal function panel    Microalbumin / creatinine urine ratio    Urine Microscopic    Uric acid      Other Visit Diagnoses     Medicare annual wellness visit, subsequent    -  Primary         History of Present Illness:     Patient presents for Welcome to Medicare visit  Patient Care Team:  Marcelle Hernandez DO as PCP - General     Review of Systems:     Review of Systems   Constitutional: Negative  HENT: Negative  Eyes: Negative  Respiratory: Negative  Cardiovascular: Negative  Gastrointestinal: Negative  Negative for abdominal distention, abdominal pain, blood in stool, bowel incontinence, constipation, diarrhea, nausea and vomiting  Endocrine: Negative  Genitourinary: Negative  Musculoskeletal: Negative  Skin: Negative  Allergic/Immunologic: Negative  Neurological: Negative  Hematological: Negative  Psychiatric/Behavioral: Negative  The patient is not nervous/anxious  All other systems reviewed and are negative         Problem List:     Patient Active Problem List   Diagnosis    Prostate cancer screening    Esophageal dysphagia    Diarrhea of infectious origin    Seasonal allergic rhinitis due to pollen    Chronic kidney disease (CKD) stage G3a/A1, moderately decreased glomerular filtration rate (GFR) between 45-59 mL/min/1 73 square meter and albuminuria creatinine ratio less than 30 mg/g [facility])      Past Medical and Surgical History:     Past Medical History:   Diagnosis Date    Fracture of ankle     Hemorrhoids      Past Surgical History:   Procedure Laterality Date    HEMORRHOID SURGERY        Family History:     Family History   Problem Relation Age of Onset  Stroke Mother     Cancer Family     Diabetes Family       Social History:     Social History     Tobacco Use   Smoking Status Never Smoker   Smokeless Tobacco Never Used     Social History     Substance and Sexual Activity   Alcohol Use Yes    Comment: occasional wine     Social History     Substance and Sexual Activity   Drug Use No      Medications and Allergies:     Current Outpatient Medications   Medication Sig Dispense Refill    aspirin 81 MG tablet Take by mouth      Biotin 10 MG CAPS Take by mouth      Calcium Carb-Cholecalciferol 1000-800 MG-UNIT TABS Take by mouth      Cod Liver Oil 1000 MG CAPS Take by mouth      glucosamine-chondroitin 500-400 MG tablet Take 1 tablet by mouth 3 (three) times a day      magnesium 30 MG tablet Take 30 mg by mouth 2 (two) times a day      Multiple Vitamins tablet Take by mouth      Omega-3 Fatty Acids (FISH OIL) 1,000 mg Take by mouth      Turmeric 500 MG CAPS Take by mouth      desloratadine (CLARINEX) 5 MG tablet Take 1 tablet (5 mg total) by mouth daily for 90 days 30 tablet 2    montelukast (SINGULAIR) 10 mg tablet Take 1 tablet (10 mg total) by mouth daily at bedtime (Patient not taking: Reported on 8/8/2019) 30 tablet 5     No current facility-administered medications for this visit        No Known Allergies   Immunizations:     Immunization History   Administered Date(s) Administered    Hep A, adult 1945    Hep B, adult 1945, 1945, 1945    Influenza Split High Dose Preservative Free IM 10/10/2012, 10/16/2013, 10/14/2014, 10/23/2015, 10/19/2016, 10/25/2017    Influenza TIV (IM) 1945, 1945    Influenza, high dose seasonal 0 5 mL 10/29/2018    Pneumococcal Conjugate 13-Valent 07/29/2015    Pneumococcal Polysaccharide PPV23 1943    Tdap 1945    Typhoid Live, oral 06/28/2016    Zoster 1945    Zoster Vaccine Recombinant 06/03/2019      Medicare Screening Tests and Risk Assessments:     Hector Maldonado is here for his Subsequent Wellness visit  Health Risk Assessment:  Patient rates overall health as good  Patient feels that their physical health rating is Same  Eyesight was rated as Same  Hearing was rated as Same  Patient feels that their emotional and mental health rating is Same  Pain experienced by patient in the last 7 days has been None  Patient states that he has experienced no weight loss or gain in last 6 months  Emotional/Mental Health:  Patient has not been feeling nervous/anxious  PHQ-9 Depression Screening:    Frequency of the following problems over the past two weeks:      1  Little interest or pleasure in doing things: 0 - not at all      2  Feeling down, depressed, or hopeless: 0 - not at all  PHQ-2 Score: 0          Broken Bones/Falls: Fall Risk Assessment:    In the past year, patient has experienced: No history of falling in past year          Bladder/Bowel:  Patient has not leaked urine accidently in the last six months  Patient reports no loss of bowel control  Immunizations:  Patient has had a flu vaccination within the last year  Patient has received a pneumonia shot  Patient has received a shingles shot  Patient has not received tetanus/diphtheria shot  Home Safety:  Patient does not have trouble with stairs inside or outside of their home  Patient currently reports that there are safety hazards present in home , working smoke alarms, no working carbon monoxide detectors  (Additional Comments: Home is all electric  Pt also has radon ventilation  )    Preventative Screenings:   prostate cancer screen performed, 7/31/2019  colon cancer screen completed, cholesterol screen completed, 7/31/2019  glaucoma eye exam completed, 2/1/2019      Nutrition:  Current diet: Regular with servings of the following:    Medications:  Patient is currently taking over-the-counter supplements       List of OTC medications includes: vitamins  Patient is able to manage medications  (Additional Comments: Pt does not take any prescription medications, only vitamins  )Lifestyle Choices:  Patient reports no tobacco use  Patient has not smoked or used tobacco in the past   Patient reports alcohol use  Alcohol use per week: maybe 1 glass of wine  Patient drives a vehicle  Patient wears seat belt  Activities of Daily Living:  Can get out of bed by his or her self, able to dress self, able to make own meals, able to do own shopping, able to bathe self, can do own laundry/housekeeping, can manage own money, pay bills and track expenses    Previous Hospitalizations:  No hospitalization or ED visit in past 12 months        Advanced Directives:  Patient has decided on a power of   Patient has spoken to designated power of   Patient has completed advanced directive  Additional Comments: I asked the patient to provide us with a copy  Preventative Screening/Counseling:      Cardiovascular:      General: Risks and Benefits Discussed          Diabetes:      General: Risks and Benefits Discussed          Colorectal Cancer:      General: Risks and Benefits Discussed          Prostate Cancer:      General: Risks and Benefits Discussed          Osteoporosis:      General: Risks and Benefits Discussed          AAA:      General: Risks and Benefits Discussed          Glaucoma:      General: Risks and Benefits Discussed          HIV:      General: Risks and Benefits Discussed          Hepatitis C:      General: Risks and Benefits Discussed        Advanced Directives:   Patient has living will for healthcare, Information on ACP and/or AD provided         No exam data present     Physical Exam:     /62 (BP Location: Left arm, Patient Position: Sitting, Cuff Size: Standard)   Pulse 72   Ht 5' 5 25" (1 657 m)   Wt 82 7 kg (182 lb 6 4 oz)   SpO2 98%   BMI 30 12 kg/m²     Physical Exam

## 2019-10-31 LAB
CREAT ?TM UR-SCNC: 103 UMOL/L
EXT MICROALBUMIN URINE RANDOM: 0.9
MICROALBUMIN/CREAT UR: NORMAL MG/G{CREAT}

## 2019-11-04 ENCOUNTER — TELEPHONE (OUTPATIENT)
Dept: FAMILY MEDICINE CLINIC | Facility: CLINIC | Age: 74
End: 2019-11-04

## 2019-11-04 NOTE — TELEPHONE ENCOUNTER
----- Message from Silvia Stock DO sent at 11/2/2019  9:26 AM EDT -----  Please call the patient regarding his  result  Will discuss at next visit  Continue current therapy  Will discuss treating uric acid at next visit  Follow up with me at his convenience

## 2019-11-04 NOTE — TELEPHONE ENCOUNTER
SPOKE TO PT'S WIFE  SHE STATES HE HAS AN APPT NEXT YEAR  SHE WILL DISCUSS THE RESULTS WITH HIM AND IF HE WANTS TO COME IN SOONER SHE WILL HAVE HIM CALL BACK